# Patient Record
Sex: FEMALE | Race: WHITE | Employment: OTHER | RURAL
[De-identification: names, ages, dates, MRNs, and addresses within clinical notes are randomized per-mention and may not be internally consistent; named-entity substitution may affect disease eponyms.]

---

## 2020-01-14 ENCOUNTER — HOSPITAL ENCOUNTER (OUTPATIENT)
Dept: MRI IMAGING | Facility: HOSPITAL | Age: 60
Discharge: HOME | End: 2020-01-14
Payer: MEDICARE

## 2020-01-14 PROCEDURE — 72148 MRI LUMBAR SPINE W/O DYE: CPT

## 2021-02-23 ENCOUNTER — TRANSCRIBE ORDERS (OUTPATIENT)
Dept: PULMONOLOGY | Facility: CLINIC | Age: 61
End: 2021-02-23

## 2021-02-25 DIAGNOSIS — Z00.6 EXAMINATION FOR NORMAL COMPARISON FOR CLINICAL RESEARCH: Primary | ICD-10-CM

## 2021-03-09 ENCOUNTER — OFFICE VISIT (OUTPATIENT)
Dept: PULMONOLOGY | Facility: CLINIC | Age: 61
End: 2021-03-09

## 2021-03-09 ENCOUNTER — NURSE NAVIGATOR (OUTPATIENT)
Dept: ONCOLOGY | Facility: CLINIC | Age: 61
End: 2021-03-09

## 2021-03-09 VITALS
DIASTOLIC BLOOD PRESSURE: 80 MMHG | SYSTOLIC BLOOD PRESSURE: 120 MMHG | HEART RATE: 99 BPM | BODY MASS INDEX: 33.43 KG/M2 | HEIGHT: 68 IN | WEIGHT: 220.6 LBS | TEMPERATURE: 97.3 F | OXYGEN SATURATION: 95 %

## 2021-03-09 DIAGNOSIS — R91.1 LUNG NODULE: Primary | ICD-10-CM

## 2021-03-09 DIAGNOSIS — Z00.6 EXAMINATION FOR NORMAL COMPARISON FOR CLINICAL RESEARCH: Primary | ICD-10-CM

## 2021-03-09 PROCEDURE — 99204 OFFICE O/P NEW MOD 45 MIN: CPT | Performed by: INTERNAL MEDICINE

## 2021-03-09 RX ORDER — ATORVASTATIN CALCIUM 20 MG/1
20 TABLET, FILM COATED ORAL DAILY
COMMUNITY
Start: 2020-12-29

## 2021-03-09 RX ORDER — FERROUS SULFATE 325(65) MG
TABLET ORAL DAILY
COMMUNITY

## 2021-03-09 RX ORDER — DESLORATADINE 5 MG/1
TABLET ORAL DAILY
COMMUNITY
Start: 2019-01-29

## 2021-03-09 RX ORDER — PROMETHAZINE HYDROCHLORIDE 25 MG/1
TABLET ORAL EVERY 6 HOURS
COMMUNITY
Start: 2019-08-14

## 2021-03-09 RX ORDER — METOPROLOL TARTRATE 50 MG/1
75 TABLET, FILM COATED ORAL 2 TIMES DAILY
COMMUNITY
Start: 2019-08-14

## 2021-03-09 RX ORDER — HYDROCODONE BITARTRATE AND ACETAMINOPHEN 7.5; 325 MG/1; MG/1
1 TABLET ORAL EVERY 4 HOURS PRN
COMMUNITY
Start: 2021-03-02

## 2021-03-09 RX ORDER — SEVELAMER CARBONATE 800 MG/1
1 TABLET, FILM COATED ORAL 4 TIMES DAILY
COMMUNITY
Start: 2019-08-14

## 2021-03-09 RX ORDER — SODIUM BICARBONATE 650 MG/1
TABLET ORAL
COMMUNITY
Start: 2021-01-06

## 2021-03-09 RX ORDER — FUROSEMIDE 40 MG/1
40 TABLET ORAL DAILY
COMMUNITY
Start: 2019-08-14

## 2021-03-09 RX ORDER — FOLIC ACID 1 MG/1
TABLET ORAL DAILY
COMMUNITY
Start: 2019-08-14

## 2021-03-09 RX ORDER — ASPIRIN 81 MG/1
81 TABLET ORAL DAILY
COMMUNITY

## 2021-03-09 RX ORDER — CALCITRIOL 0.5 UG/1
0.5 CAPSULE, LIQUID FILLED ORAL DAILY
COMMUNITY
Start: 2021-01-28 | End: 2021-03-09 | Stop reason: SDUPTHER

## 2021-03-09 RX ORDER — NITROGLYCERIN 0.4 MG/1
0.4 TABLET SUBLINGUAL
COMMUNITY
Start: 2019-08-14

## 2021-03-09 RX ORDER — DIAZEPAM 10 MG/1
10 TABLET ORAL EVERY 12 HOURS PRN
COMMUNITY
Start: 2021-02-22

## 2021-03-09 RX ORDER — DEXLANSOPRAZOLE 60 MG/1
CAPSULE, DELAYED RELEASE ORAL DAILY
COMMUNITY
Start: 2019-01-29

## 2021-03-09 RX ORDER — ALBUTEROL SULFATE 90 UG/1
2 AEROSOL, METERED RESPIRATORY (INHALATION) EVERY 6 HOURS
COMMUNITY
Start: 2019-08-14

## 2021-03-09 RX ORDER — ERGOCALCIFEROL 1.25 MG/1
1 CAPSULE ORAL
COMMUNITY
Start: 2019-08-14

## 2021-03-09 RX ORDER — FLUTICASONE PROPIONATE 50 MCG
SPRAY, SUSPENSION (ML) NASAL DAILY
COMMUNITY
Start: 2019-08-14

## 2021-03-09 NOTE — PROGRESS NOTES
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  Was admitted approximately 24 hours and discharged.  As part of her evaluation she had a CT Chest that was abnormal.  Patient reports ongoing dyspnea on exertion.  No fever, chills, weight loss, hemoptysis, etc.  Cough is dry and non-productive.      She apparently has had admissions to  in past and likely has some old CT scans on record there.  Patient is not able to give many details about her medical history and records from [facility] are very sparse.    PMH:    No past medical history on file.  PSH:    No past surgical history on file.  FH:    No family history on file.  SH:    Social History     Socioeconomic History   • Marital status:      Spouse name: Not on file   • Number of children: Not on file   • Years of education: Not on file   • Highest education level: Not on file   Tobacco Use   • Smoking status: Former Smoker     Packs/day: 1.00     Years: 20.00     Pack years: 20.00     Types: Cigarettes   • Smokeless tobacco: Never Used   Substance and Sexual Activity   • Alcohol use: Not Currently   • Drug use: Never   • Sexual activity: Defer     ALLERGIES:    Allergies   Allergen Reactions   • Gabapentin Unknown - High Severity   • Hydrochlorothiazide Unknown - High Severity     MEDICATIONS:      Current Outpatient Medications:   •  albuterol sulfate HFA (ProAir HFA) 108 (90 Base) MCG/ACT inhaler, Inhale 2 puffs Every 6 (Six) Hours., Disp: , Rfl:   •  aspirin 81 MG EC tablet, Take 81 mg by mouth Daily., Disp: , Rfl:   •  atorvastatin (LIPITOR) 20 MG tablet, , Disp: , Rfl:   •  desloratadine (Clarinex) 5 MG tablet, Take  by mouth Daily., Disp: , Rfl:   •  dexlansoprazole (Dexilant) 60 MG capsule, Take  by mouth Daily., Disp: , Rfl:   •  diazePAM (VALIUM) 10 MG tablet, ,  Disp: , Rfl:   •  ferrous sulfate (Iron Supplement) 325 (65 FE) MG tablet, Take  by mouth Daily., Disp: , Rfl:   •  fluticasone (FLONASE) 50 MCG/ACT nasal spray, into the nostril(s) as directed by provider Daily., Disp: , Rfl:   •  folic acid (FOLVITE) 1 MG tablet, Take  by mouth Daily., Disp: , Rfl:   •  furosemide (LASIX) 40 MG tablet, Take  by mouth., Disp: , Rfl:   •  HYDROcodone-acetaminophen (NORCO) 7.5-325 MG per tablet, , Disp: , Rfl:   •  metoprolol tartrate (LOPRESSOR) 50 MG tablet, Take  by mouth., Disp: , Rfl:   •  nitroglycerin (Nitrostat) 0.4 MG SL tablet, Place  under the tongue., Disp: , Rfl:   •  promethazine (PHENERGAN) 25 MG tablet, Take  by mouth Every 6 (Six) Hours., Disp: , Rfl:   •  sevelamer (Renvela) 800 MG tablet, Take 2 tablets by mouth., Disp: , Rfl:   •  sodium bicarbonate 650 MG tablet, , Disp: , Rfl:   •  Sodium Polystyrene Sulfonate (Kayexalate) powder, Take  by mouth Daily., Disp: , Rfl:   •  vitamin D (ERGOCALCIFEROL) 1.25 MG (86327 UT) capsule capsule, Take 1 capsule by mouth., Disp: , Rfl:   ROS:  Per HPI, otherwise all systems reviewed and negative.    DIAGNOSTIC DATA (Reviewed and interpreted by me unless otherwise specified):    CT Chest 2/15/21 - diffuse micronodules with bronchiolar distribution, more dense partially cavitary nodule in right base with some central calcification, partially calcified mediastinal / hilar LN's    Vitals:    03/09/21 1252   BP: 120/80   Pulse: 99   Temp: 97.3 °F (36.3 °C)   SpO2: 95%       Physical Exam   Constitutional: Oriented to person, place, and time. Appears well-developed and well-nourished.   Head: Normocephalic and atraumatic.   Nose: Nose normal.   Mouth/Throat: Oropharynx is clear and moist.   Eyes: Conjunctivae are normal.  Pupils normal.  Neck: No tracheal deviation present.   Cardiovascular: Normal rate, regular rhythm, normal heart sounds and intact distal pulses.  Exam reveals no gallop and no friction rub.  No thrill.  No JVD.   No edema.  No murmur heard.  Pulmonary/Chest: Effort normal and breath sounds normal.  No tenderness to palpation.  No clubbing.   Abdominal: Soft. Bowel sounds are normal. No distension. No tenderness. There is no guarding.   Musculoskeletal: Normal range of motion.  No tenderness.  Lymphadenopathy:  No cervical adenopathy.   Neurological:  No new focal neurological deficits observed   Skin: Skin is warm and dry. No rash noted.   Psychiatric: Normal mood and affect.  Behavior is normal. Judgment normal.    Assessment/Plan     1)  Abnormal CT Chest - unclear if these findings have been present for a long time or are more acute.  Does not appear acutely ill today.  Differential includes viral bronchiolitis, aspiration, miliary fungal / mycobacterial infection vs others.  First step is to get old CT's from [facility] if available.  If findings old and stable I don't think this necessarily needs repeat imaging / bronchoscopy etc.  If new, she needs short interval scan at 4-6 week art and possible bronchoscopy + fungal serologies and quantiferon.    2) Dyspnea - suspect some component of COPD.  Give sample of Trelegy 100.  PFT next visit.    RTC 1 month w/ Full PFT w/ BD    Massimo Larson MD  Pulmonology and Critical Care Medicine  03/09/21 13:34 EST  Electronically Signed    C.C.:  Armani Catherine, ARABELLA*, Dorene, CRISTINA Harrington

## 2021-03-09 NOTE — PROGRESS NOTES
Met patient in lung nodule clinic with Dr. LALIT Larson. She has smoking history but quit in 2018. She is on dialysis due to TTP. She developed acute illness with n/v, fatigue and SOA mid Feb. Presented to ER at that time and noted to have 2.3 x 1.3cm RLL with small punctate calcium and cavitation. She denies hemoptysis or unexplained weight loss. She has extensive history of care and scans done at . Will request any CT chest from them for comparison. Per Dr. Larson change inhaler therapy and return to clinic x4wks for further evaluation. She v/u and agreeable to plan. Introduced lung navigator role and provided contact information. She knows to call with questions or concerns.

## 2021-03-10 DIAGNOSIS — Z01.812 BLOOD TESTS PRIOR TO TREATMENT OR PROCEDURE: Primary | ICD-10-CM

## 2021-03-10 DIAGNOSIS — Z00.6 EXAMINATION FOR NORMAL COMPARISON FOR CLINICAL RESEARCH: Primary | ICD-10-CM

## 2021-05-13 ENCOUNTER — HOSPITAL ENCOUNTER (OUTPATIENT)
Facility: HOSPITAL | Age: 61
Discharge: HOME | End: 2021-05-13
Payer: MEDICARE

## 2021-05-13 ENCOUNTER — OFFICE VISIT (OUTPATIENT)
Dept: CARDIOLOGY | Facility: CLINIC | Age: 61
End: 2021-05-13

## 2021-05-13 VITALS
OXYGEN SATURATION: 96 % | HEART RATE: 84 BPM | WEIGHT: 206 LBS | SYSTOLIC BLOOD PRESSURE: 110 MMHG | BODY MASS INDEX: 31.22 KG/M2 | HEIGHT: 68 IN | DIASTOLIC BLOOD PRESSURE: 58 MMHG

## 2021-05-13 DIAGNOSIS — R06.09 DYSPNEA ON EXERTION: Primary | ICD-10-CM

## 2021-05-13 DIAGNOSIS — R07.2 PRECORDIAL PAIN: ICD-10-CM

## 2021-05-13 PROCEDURE — 99204 OFFICE O/P NEW MOD 45 MIN: CPT | Performed by: INTERNAL MEDICINE

## 2021-05-13 PROCEDURE — 93000 ELECTROCARDIOGRAM COMPLETE: CPT | Performed by: INTERNAL MEDICINE

## 2021-05-13 PROCEDURE — 93005 ELECTROCARDIOGRAM TRACING: CPT

## 2021-05-13 RX ORDER — FOLIC ACID/VIT B COMPLEX AND C 0.8 MG
TABLET ORAL DAILY
COMMUNITY

## 2021-05-13 RX ORDER — ISOSORBIDE MONONITRATE 30 MG/1
30 TABLET, EXTENDED RELEASE ORAL DAILY
Qty: 30 TABLET | Refills: 11 | Status: SHIPPED | OUTPATIENT
Start: 2021-05-13

## 2021-05-13 RX ORDER — CALCITRIOL 0.5 UG/1
0.5 CAPSULE, LIQUID FILLED ORAL DAILY
COMMUNITY
Start: 2021-04-22

## 2021-05-13 RX ORDER — DOCUSATE SODIUM 100 MG/1
100 CAPSULE, LIQUID FILLED ORAL 2 TIMES DAILY
COMMUNITY

## 2021-05-13 NOTE — PROGRESS NOTES
[facility]  Consultation H&P  Florencia Charles  1960  [phone]  There is no work phone number on file..    VISIT DATE:  05/13/2021    PCP: Armani Catherine, CRISTINA  [address] [place], [place] [id number]    CC:  Chief Complaint   Patient presents with   • Diastolic heart failure     Consult        ASSESSMENT:   Diagnosis Plan   1. Dyspnea on exertion     2. Precordial pain         PLAN:  Dyspnea on exertion: [place] heart class III.  Either consistent with underlying congestive heart failure or potential anginal equivalent.  Transthoracic echo pending to assess underlying myocardial structure and function.  Mara scan myocardial perfusion imaging pending for ischemia evaluation.  Starting Imdur 30 mg p.o. daily.  Otherwise continue current medical therapy.  Unlikely that we will be able to control peripheral edema with diuretics given her advanced renal disease, patient will likely need to start dialysis 3 times a week.  Nephrology is following her chronic anemia and giving iron infusions as needed.    History of Present Illness   60-year-old female with a history of end-stage renal disease on hemodialysis 3 days a week, labile hypertension, dyslipidemia, obesity, chronic anemia presenting with worsening bilateral lower extremity edema and dyspnea on exertion over the previous 2 months.  Reports new onset bilateral lower extremity edema.  She currently takes Lasix daily but makes minimal amount of urine.  Nephrology is considering increasing her to 3 times a week for her dialysis.  She does report that she feels somewhat better the day after dialysis but her functional capacity is still significantly different compared to 3 months ago.  She has shortness of breath with mild precordial chest discomfort when walking from room to room in her house.  No rest symptoms.  Denies PND orthopnea.  She is compliant with medical therapy.    PHYSICAL EXAMINATION:  Vitals:    05/13/21 1053   BP:  "110/58   BP Location: Right arm   Patient Position: Sitting   Pulse: 84   SpO2: 96%   Weight: 93.4 kg (206 lb)   Height: 172.7 cm (68\")     General Appearance:    Alert, cooperative, no distress, appears stated age   Head:    Normocephalic, without obvious abnormality, atraumatic   Eyes:    conjunctiva/corneas clear, EOM's intact, fundi     benign, both eyes   Ears:    Normal TM's and external ear canals, both ears   Nose:   Nares normal, septum midline, mucosa normal, no drainage    or sinus tenderness   Throat:   Lips, mucosa, and tongue normal; teeth and gums normal   Neck:   Supple, symmetrical, trachea midline, no adenopathy;     thyroid:  no enlargement/tenderness/nodules; no carotid    bruit or JVD   Back:     Symmetric, no curvature, ROM normal, no CVA tenderness   Lungs:     Clear to auscultation bilaterally, respirations unlabored   Chest Wall:    No tenderness or deformity    Heart:    Regular rate and rhythm, S1 and S2 normal, no murmur, rub   or gallop, normal carotid impulse bilaterally without bruit.   Abdomen:     Soft, non-tender, bowel sounds active all four quadrants,     no masses, no organomegaly   Extremities:   Extremities normal, atraumatic, no cyanosis.  2+ bilateral pedal and ankle edema   Pulses:   2+ and symmetric all extremities   Skin:   Skin color, texture, turgor normal, no rashes or lesions   Lymph nodes:   Cervical, supraclavicular, and axillary nodes normal   Neurologic:   normal strength, sensation intact     throughout       Diagnostic Data:    ECG 12 Lead    Date/Time: 5/13/2021 10:56 AM  Performed by: Walter Mckenzie III, MD  Authorized by: Walter Mckenzie III, MD   Previous ECG: no previous ECG available  Rhythm: sinus rhythm  Other findings: low voltage    Clinical impression: normal ECG          No results found for: CHLPL, TRIG, HDL, LDLDIRECT  No results found for: GLUCOSE, BUN, CREATININE, NA, K, CL, CO2, CREATININE, BUN  No results found for: HGBA1C  No results found for: " WBC, HGB, HCT, PLT    PROBLEM LIST:  Patient Active Problem List   Diagnosis   • Dyspnea on exertion   • Precordial pain       PAST MEDICAL HX  History reviewed. No pertinent past medical history.    Allergies  Allergies   Allergen Reactions   • Gabapentin Unknown - High Severity   • Hydrochlorothiazide Unknown - High Severity       Current Medications    Current Outpatient Medications:   •  albuterol sulfate HFA (ProAir HFA) 108 (90 Base) MCG/ACT inhaler, Inhale 2 puffs Every 6 (Six) Hours., Disp: , Rfl:   •  aspirin 81 MG EC tablet, Take 81 mg by mouth Daily., Disp: , Rfl:   •  atorvastatin (LIPITOR) 20 MG tablet, 20 mg Daily., Disp: , Rfl:   •  B Complex-C-Folic Acid (Renal Multivitamin Formula) tablet, Take  by mouth Daily., Disp: , Rfl:   •  calcitriol (ROCALTROL) 0.5 MCG capsule, Take 0.5 mcg by mouth Daily., Disp: , Rfl:   •  camphor-menthol (SARNA) 0.5-0.5 % lotion, Apply  topically to the appropriate area as directed As Needed for Itching., Disp: , Rfl:   •  desloratadine (Clarinex) 5 MG tablet, Take  by mouth Daily., Disp: , Rfl:   •  dexlansoprazole (Dexilant) 60 MG capsule, Take  by mouth Daily., Disp: , Rfl:   •  diazePAM (VALIUM) 10 MG tablet, 10 mg Every 12 (Twelve) Hours As Needed., Disp: , Rfl:   •  docusate sodium (COLACE) 100 MG capsule, Take 100 mg by mouth 2 (Two) Times a Day., Disp: , Rfl:   •  ferrous sulfate (Iron Supplement) 325 (65 FE) MG tablet, Take  by mouth Daily., Disp: , Rfl:   •  fluticasone (FLONASE) 50 MCG/ACT nasal spray, into the nostril(s) as directed by provider Daily., Disp: , Rfl:   •  Fluticasone-Umeclidin-Vilant (Trelegy Ellipta) 100-62.5-25 MCG/INH aerosol powder , Inhale 1 each Daily., Disp: 1 each, Rfl: 11  •  folic acid (FOLVITE) 1 MG tablet, Take  by mouth Daily., Disp: , Rfl:   •  furosemide (LASIX) 40 MG tablet, Take 40 mg by mouth Daily., Disp: , Rfl:   •  HYDROcodone-acetaminophen (NORCO) 7.5-325 MG per tablet, 1 tablet Every 4 (Four) Hours As Needed., Disp: , Rfl:    •  Methoxy PEG-Epoetin Beta (MIRCERA IJ), 200 mcg Every 14 (Fourteen) Days., Disp: , Rfl:   •  metoprolol tartrate (LOPRESSOR) 50 MG tablet, Take 75 mg by mouth 2 (Two) Times a Day., Disp: , Rfl:   •  nitroglycerin (Nitrostat) 0.4 MG SL tablet, Place 0.4 mg under the tongue Every 5 (Five) Minutes As Needed., Disp: , Rfl:   •  promethazine (PHENERGAN) 25 MG tablet, Take  by mouth Every 6 (Six) Hours., Disp: , Rfl:   •  sevelamer (Renvela) 800 MG tablet, Take 1 tablet by mouth 4 (Four) Times a Day., Disp: , Rfl:   •  sodium bicarbonate 650 MG tablet, , Disp: , Rfl:   •  Sodium Polystyrene Sulfonate (Kayexalate) powder, Take  by mouth Daily., Disp: , Rfl:   •  vitamin D (ERGOCALCIFEROL) 1.25 MG (72809 UT) capsule capsule, Take 1 capsule by mouth Every 7 (Seven) Days., Disp: , Rfl:   •  isosorbide mononitrate (IMDUR) 30 MG 24 hr tablet, Take 1 tablet by mouth Daily., Disp: 30 tablet, Rfl: 11         ROS  ROS    All other body systems reviewed and are negative    SOCIAL HX  Social History     Socioeconomic History   • Marital status:      Spouse name: Not on file   • Number of children: Not on file   • Years of education: Not on file   • Highest education level: Not on file   Tobacco Use   • Smoking status: Former Smoker     Packs/day: 1.00     Years: 20.00     Pack years: 20.00     Types: Cigarettes   • Smokeless tobacco: Never Used   Substance and Sexual Activity   • Alcohol use: Not Currently   • Drug use: Never   • Sexual activity: Defer       FAMILY HX  History reviewed. No pertinent family history.          Walter Mckenzie III, MD, FACC

## 2021-06-08 ENCOUNTER — HOSPITAL ENCOUNTER (EMERGENCY)
Facility: HOSPITAL | Age: 61
Discharge: LEFT BEFORE DISCHARGE | End: 2021-06-08
Attending: EMERGENCY MEDICINE
Payer: MEDICARE

## 2021-06-08 ENCOUNTER — APPOINTMENT (OUTPATIENT)
Dept: CT IMAGING | Facility: HOSPITAL | Age: 61
End: 2021-06-08
Payer: MEDICARE

## 2021-06-08 ENCOUNTER — APPOINTMENT (OUTPATIENT)
Dept: GENERAL RADIOLOGY | Facility: HOSPITAL | Age: 61
End: 2021-06-08
Payer: MEDICARE

## 2021-06-08 VITALS
HEART RATE: 94 BPM | OXYGEN SATURATION: 99 % | DIASTOLIC BLOOD PRESSURE: 95 MMHG | HEIGHT: 68 IN | RESPIRATION RATE: 20 BRPM | BODY MASS INDEX: 34.86 KG/M2 | WEIGHT: 230 LBS | SYSTOLIC BLOOD PRESSURE: 155 MMHG | TEMPERATURE: 97.2 F

## 2021-06-08 DIAGNOSIS — Z91.199 MEDICALLY NONCOMPLIANT: ICD-10-CM

## 2021-06-08 DIAGNOSIS — R53.1 GENERAL WEAKNESS: ICD-10-CM

## 2021-06-08 DIAGNOSIS — R06.02 SHORTNESS OF BREATH: Primary | ICD-10-CM

## 2021-06-08 DIAGNOSIS — R10.11 ABDOMINAL PAIN, RIGHT UPPER QUADRANT: ICD-10-CM

## 2021-06-08 DIAGNOSIS — E87.5 HYPERKALEMIA: ICD-10-CM

## 2021-06-08 DIAGNOSIS — N18.6 ESRD (END STAGE RENAL DISEASE) (HCC): ICD-10-CM

## 2021-06-08 LAB
A/G RATIO: 1.3 (ref 0.8–2)
ALBUMIN SERPL-MCNC: 3.8 G/DL (ref 3.4–4.8)
ALP BLD-CCNC: 113 U/L (ref 25–100)
ALT SERPL-CCNC: 28 U/L (ref 4–36)
ANION GAP SERPL CALCULATED.3IONS-SCNC: 19 MMOL/L (ref 3–16)
AST SERPL-CCNC: 26 U/L (ref 8–33)
BASOPHILS ABSOLUTE: 0 K/UL (ref 0–0.1)
BASOPHILS RELATIVE PERCENT: 0.4 %
BILIRUB SERPL-MCNC: 0.6 MG/DL (ref 0.3–1.2)
BUN BLDV-MCNC: 45 MG/DL (ref 6–20)
CALCIUM SERPL-MCNC: 10.2 MG/DL (ref 8.5–10.5)
CHLORIDE BLD-SCNC: 87 MMOL/L (ref 98–107)
CO2: 24 MMOL/L (ref 20–30)
CREAT SERPL-MCNC: 7.2 MG/DL (ref 0.4–1.2)
EKG ATRIAL RATE: 91 BPM
EKG DIAGNOSIS: NORMAL
EKG P AXIS: 60 DEGREES
EKG P-R INTERVAL: 124 MS
EKG Q-T INTERVAL: 366 MS
EKG QRS DURATION: 72 MS
EKG QTC CALCULATION (BAZETT): 450 MS
EKG R AXIS: 110 DEGREES
EKG T AXIS: 9 DEGREES
EKG VENTRICULAR RATE: 91 BPM
EOSINOPHILS ABSOLUTE: 0 K/UL (ref 0–0.4)
EOSINOPHILS RELATIVE PERCENT: 0.4 %
GFR AFRICAN AMERICAN: 7
GFR NON-AFRICAN AMERICAN: 6
GLOBULIN: 2.9 G/DL
GLUCOSE BLD-MCNC: 84 MG/DL (ref 74–106)
HCT VFR BLD CALC: 30.2 % (ref 37–47)
HEMOGLOBIN: 9.1 G/DL (ref 11.5–16.5)
IMMATURE GRANULOCYTES #: 0 K/UL
IMMATURE GRANULOCYTES %: 0.4 % (ref 0–5)
LIPASE: 17 U/L (ref 5.6–51.3)
LYMPHOCYTES ABSOLUTE: 1.3 K/UL (ref 1.5–4)
LYMPHOCYTES RELATIVE PERCENT: 16.4 %
MCH RBC QN AUTO: 30 PG (ref 27–32)
MCHC RBC AUTO-ENTMCNC: 30.1 G/DL (ref 31–35)
MCV RBC AUTO: 99.7 FL (ref 80–100)
MONOCYTES ABSOLUTE: 0.6 K/UL (ref 0.2–0.8)
MONOCYTES RELATIVE PERCENT: 8.2 %
NEUTROPHILS ABSOLUTE: 5.7 K/UL (ref 2–7.5)
NEUTROPHILS RELATIVE PERCENT: 74.2 %
PDW BLD-RTO: 24.1 % (ref 11–16)
PLATELET # BLD: 205 K/UL (ref 150–400)
PMV BLD AUTO: 10.5 FL (ref 6–10)
POTASSIUM SERPL-SCNC: 6.2 MMOL/L (ref 3.4–5.1)
PRO-BNP: ABNORMAL PG/ML (ref 0–1800)
RBC # BLD: 3.03 M/UL (ref 3.8–5.8)
SODIUM BLD-SCNC: 130 MMOL/L (ref 136–145)
TOTAL PROTEIN: 6.7 G/DL (ref 6.4–8.3)
TROPONIN: <0.3 NG/ML
WBC # BLD: 7.7 K/UL (ref 4–11)

## 2021-06-08 PROCEDURE — 6370000000 HC RX 637 (ALT 250 FOR IP)

## 2021-06-08 PROCEDURE — 83880 ASSAY OF NATRIURETIC PEPTIDE: CPT

## 2021-06-08 PROCEDURE — 93005 ELECTROCARDIOGRAM TRACING: CPT

## 2021-06-08 PROCEDURE — 84484 ASSAY OF TROPONIN QUANT: CPT

## 2021-06-08 PROCEDURE — 74176 CT ABD & PELVIS W/O CONTRAST: CPT

## 2021-06-08 PROCEDURE — 96374 THER/PROPH/DIAG INJ IV PUSH: CPT

## 2021-06-08 PROCEDURE — 6360000002 HC RX W HCPCS: Performed by: EMERGENCY MEDICINE

## 2021-06-08 PROCEDURE — 71045 X-RAY EXAM CHEST 1 VIEW: CPT

## 2021-06-08 PROCEDURE — 85025 COMPLETE CBC W/AUTO DIFF WBC: CPT

## 2021-06-08 PROCEDURE — 36415 COLL VENOUS BLD VENIPUNCTURE: CPT

## 2021-06-08 PROCEDURE — 80053 COMPREHEN METABOLIC PANEL: CPT

## 2021-06-08 PROCEDURE — 83690 ASSAY OF LIPASE: CPT

## 2021-06-08 PROCEDURE — 99285 EMERGENCY DEPT VISIT HI MDM: CPT

## 2021-06-08 PROCEDURE — 94644 CONT INHLJ TX 1ST HOUR: CPT

## 2021-06-08 PROCEDURE — 6370000000 HC RX 637 (ALT 250 FOR IP): Performed by: EMERGENCY MEDICINE

## 2021-06-08 PROCEDURE — 2500000003 HC RX 250 WO HCPCS: Performed by: EMERGENCY MEDICINE

## 2021-06-08 PROCEDURE — 96375 TX/PRO/DX INJ NEW DRUG ADDON: CPT

## 2021-06-08 RX ORDER — DEXTROSE MONOHYDRATE 25 G/50ML
25 INJECTION, SOLUTION INTRAVENOUS ONCE
Status: COMPLETED | OUTPATIENT
Start: 2021-06-08 | End: 2021-06-08

## 2021-06-08 RX ORDER — SODIUM POLYSTYRENE SULFONATE 15 G/60ML
15 SUSPENSION ORAL; RECTAL ONCE
Status: COMPLETED | OUTPATIENT
Start: 2021-06-08 | End: 2021-06-08

## 2021-06-08 RX ADMIN — ALBUTEROL SULFATE 7.5 MG: 5 SOLUTION RESPIRATORY (INHALATION) at 18:57

## 2021-06-08 RX ADMIN — SODIUM BICARBONATE 50 MEQ: 84 INJECTION, SOLUTION INTRAVENOUS at 19:03

## 2021-06-08 RX ADMIN — DEXTROSE MONOHYDRATE 25 G: 25 INJECTION, SOLUTION INTRAVENOUS at 19:04

## 2021-06-08 RX ADMIN — SODIUM POLYSTYRENE SULFONATE 15 G: 15 SUSPENSION ORAL; RECTAL at 19:04

## 2021-06-08 RX ADMIN — Medication 10 UNITS: at 19:04

## 2021-06-08 ASSESSMENT — PAIN DESCRIPTION - FREQUENCY: FREQUENCY: CONTINUOUS

## 2021-06-08 ASSESSMENT — PAIN SCALES - GENERAL: PAINLEVEL_OUTOF10: 5

## 2021-06-08 ASSESSMENT — PAIN DESCRIPTION - LOCATION: LOCATION: BUTTOCKS

## 2021-06-08 ASSESSMENT — PAIN DESCRIPTION - DESCRIPTORS: DESCRIPTORS: THROBBING

## 2021-06-08 NOTE — ED NOTES
Pt has AV fistula of right arm that is old, npon-functioning.  Reports that her RUE CAN be used for labs,IV and BP.        Jennifer Go RN  06/08/21 7634

## 2021-06-08 NOTE — ED TRIAGE NOTES
Pt reports to the ED after a fall at home. Pt states that her legs gave out on her. She reports that she hit her buttocks, doesn't believe she hit her head or other extremities. Pt is dialysis pt on MF. Non-functioning shunt in RUE. NO-STICK- USE of LUE, due to [address], Student.

## 2021-06-08 NOTE — ED NOTES
Report given to primary RN, Shanelle French RN. Rn denies any further questions.         Jennifer Go RN  06/08/21 4311

## 2021-06-09 NOTE — ED PROVIDER NOTES
Emergency Department Encounter  Location: [address] [place] [address] Court    Patient: Abelardo Lowe  MRN: [id number]  : 1960  Date of evaluation: 2021  ED Provider: Kayla Moon MD    [address] [place] [address] Drive was checked out to me by Dr. Julio Cesar France. Please see his/her initial documentation for details of the patient's initial ED presentation, physical exam and completed studies. In brief, Abelardo Lowe is a 61 y.o. female that presented to the emergency department with multiple complaints per patient and family. Patient's care was taken over from Dr. Julio Cesar France at shift change pending labs, CT scan, reexamination definitive care. Patient was brought to the emergency department by family secondary to concern of increased weakness, decreased oral intake, falling and right upper quadrant abdominal pain. Patient was just discharged from [facility] 1 day ago secondary to reported pneumonia. Family states that they tried to get patient to be transferred to a nursing home for rehabilitation but that they had stated that insurance would not cover it and that they were done to send her home. Family states that the patient wanted to come to [facility] for reevaluation and admission with the hopes of nursing home placement. Family states that her weakness and symptoms have continued since being discharged 1 day ago. Patient denied any chest pain or any focal signs or symptoms.     I have reviewed and interpreted all of the currently available lab results and diagnostics from this visit:  Results for orders placed or performed during the hospital encounter of 21   CBC Auto Differential   Result Value Ref Range    WBC 7.7 4.0 - 11.0 K/uL    RBC 3.03 (L) 3.80 - 5.80 M/uL    Hemoglobin 9.1 (L) 11.5 - 16.5 g/dL    Hematocrit 30.2 (L) 37.0 - 47.0 %    MCV 99.7 80.0 - 100.0 fL    MCH 30.0 27.0 - 32.0 pg    MCHC 30.1 (L) 31.0 - 35.0 g/dL    RDW 24.1 (H) 11.0 - 16.0 %    Platelets 971 150 - 400 K/uL    MPV 10.5 (H) 6.0 - 10.0 fL    Neutrophils % 74.2 %    Immature Granulocytes % 0.4 0.0 - 5.0 %    Lymphocytes % 16.4 %    Monocytes % 8.2 %    Eosinophils % 0.4 %    Basophils % 0.4 %    Neutrophils Absolute 5.7 2.0 - 7.5 K/uL    Immature Granulocytes # 0.0 K/uL    Lymphocytes Absolute 1.3 (L) 1.5 - 4.0 K/uL    Monocytes Absolute 0.6 0.2 - 0.8 K/uL    Eosinophils Absolute 0.0 0.0 - 0.4 K/uL    Basophils Absolute 0.0 0.0 - 0.1 K/uL   Comprehensive Metabolic Panel   Result Value Ref Range    Sodium 130 (L) 136 - 145 mmol/L    Potassium 6.2 (HH) 3.4 - 5.1 mmol/L    Chloride 87 (L) 98 - 107 mmol/L    CO2 24 20 - 30 mmol/L    Anion Gap 19 (H) 3 - 16    Glucose 84 74 - 106 mg/dL    BUN 45 (H) 6 - 20 mg/dL    CREATININE 7.2 (H) 0.4 - 1.2 mg/dL    GFR Non-African American 6 (L) >59    GFR  7 (L) >59    Calcium 10.2 8.5 - 10.5 mg/dL    Total Protein 6.7 6.4 - 8.3 g/dL    Albumin 3.8 3.4 - 4.8 g/dL    Albumin/Globulin Ratio 1.3 0.8 - 2.0    Total Bilirubin 0.6 0.3 - 1.2 mg/dL    Alkaline Phosphatase 113 (H) 25 - 100 U/L    ALT 28 4 - 36 U/L    AST 26 8 - 33 U/L    Globulin 2.9 g/dL   Troponin   Result Value Ref Range    Troponin <0.30 <0.30 ng/mL   Lipase   Result Value Ref Range    Lipase 17.0 5.6 - 51.3 U/L   Brain Natriuretic Peptide   Result Value Ref Range    Pro-BNP 35,000 (H) 0 - 1,800 pg/mL   EKG 12 Lead   Result Value Ref Range    Ventricular Rate 91 BPM    Atrial Rate 91 BPM    P-R Interval 124 ms    QRS Duration 72 ms    Q-T Interval 366 ms    QTc Calculation (Bazett) 450 ms    P Axis 60 degrees    R Axis 110 degrees    T Axis 9 degrees    Diagnosis       EKG performed in ER and to be interpretated by ER physician in EpicConfirmed by MD, ER ([id number]),  Cathy MARCOS ([phone]) on 6/8/2021 6:00:07 PM     CT ABDOMEN PELVIS WO CONTRAST Additional Contrast? None    Result Date: 6/8/2021  EXAM: CT ABDOMEN AND PELVIS WITHOUT CONTRAST INDICATION: Abdominal pain, fall COMPARISON: None. TECHNIQUE: Axial CT imaging obtained from lung bases through pelvis. Axial images and multiplanar reformatted images are provided for review. Up-to-date CT equipment and radiation dose reduction techniques were employed. IV Contrast: None. Oral Contrast: No. FINDINGS: There are innumerable micronodules throughout the visualized lungs. There is a small left pleural effusion. The heart is mildly enlarged. There is no pericardial effusion. There is mild anasarca. Noncontrast images of the liver appear normal without biliary ductal dilatation. The gallbladder appears normal. Noncontrast images of the spleen, pancreas, and adrenal glands appear normal. Bilateral kidneys are severely atrophic and contain multiple small cysts and nonobstructing calculi. There is an 11 mm nonobstructing left renal calculus and smaller bilateral renal calculi. The urinary bladder is nondistended and appears normal. The uterus is unremarkable. The stomach, small and large bowel are normal in caliber without indication of obstruction. There is colonic diverticulosis without evidence of diverticulitis. The appendix appears normal. No free intraperitoneal air is seen. No lymphadenopathy is seen. The abdominal aorta is atherosclerotic and normal in caliber. The osseous structures are intact. Multiple large Schmorl's nodes are present in the lumbar spine. 1.  Micronodules throughout the visualized lungs are nonspecific but could be seen in miliary TB or fungal infection, bronchiolitis, hematogenous metastases, sarcoidosis. Recommend clinical workup. 2.  Small left pleural effusion. 3.  Atrophic bilateral kidneys with nonobstructing bilateral renal calculi. 4.  Diverticulosis without evidence of diverticulitis. XR CHEST PORTABLE    Result Date: 6/8/2021  EXAM: PORTABLE AP CHEST X-RAY, 1815 hours INDICATION: SOA COMPARISON: none FINDINGS: There is mild interstitial edema. There is a moderate left pleural effusion. There is no pneumothorax. The heart is mildly enlarged. A chronic left clavicle deformity is noted. Cardiomegaly and mild interstitial edema with moderate left pleural effusion. Final ED Course and MDM: In brief, Aiden Rodriguez is a 61 y.o. female whose care was signed out to me by the outgoing provider. In brief, please see HPI above for shift change. Prior to shift change it was noted that patient was hyperkalemic with a potassium of 6.2. Family states the patient gets dialysis on Mondays and Fridays. Prior to shift change patient was given Kayexalate p.o., 1 amp of D50, 10 units of regular insulin IV and 1 amp of bicarb. EKG revealed prior to shift change normal sinus rhythm rate of 91 with out peaked T waves noted. Further review of patient's labs reveal a negative troponin. Hemoglobin was 9.1 thousand and white count was 7.7 thousand. Glucose was 84. Chest x-ray revealed cardiomegaly with mild interstitial edema with moderate left pleural effusion per radiology. Patient's BNP was 35,000. Results were reviewed with patient at [phone] and patient states that she had resolution of her symptoms that she did not want to be admitted or transferred and wanted to sign out AMA. Review of labs were once again done with patient with a concern of the elevated potassium and the need for treatment for it along with a BNP of greater than 35,000 and a chest x-ray revealed mild interstitial edema with moderate left pleural effusion. Patient states that she is aware of the risk of sign out AMA but states that she felt much better and no longer had the symptoms and demanded to leave immediately AMA.   Patient was alert and oriented x3 GCS of 15 in no acute distress    ED Medication Orders (From admission, onward)    Start Ordered     Status Ordering Provider    06/08/21 1900 06/08/21 1845  sodium polystyrene (KAYEXALATE) 15 GM/60ML suspension 15 g  ONCE      Last MAR action: Given - by Jack Bynum on 06/08/21 at [facility],

## 2021-06-09 NOTE — ED NOTES
Contacted lab and spoke with [address] [facility] that Dr. Giulia Alford was adding a lipase and a BNP to patient's lab work.      [address]  06/08/21 2012